# Patient Record
Sex: FEMALE | URBAN - METROPOLITAN AREA
[De-identification: names, ages, dates, MRNs, and addresses within clinical notes are randomized per-mention and may not be internally consistent; named-entity substitution may affect disease eponyms.]

---

## 2020-07-13 ENCOUNTER — APPOINTMENT (RX ONLY)
Dept: URBAN - METROPOLITAN AREA CLINIC 304 | Facility: CLINIC | Age: 20
Setting detail: DERMATOLOGY
End: 2020-07-13

## 2020-07-13 DIAGNOSIS — L91.0 HYPERTROPHIC SCAR: ICD-10-CM

## 2020-07-13 PROCEDURE — 99202 OFFICE O/P NEW SF 15 MIN: CPT

## 2020-07-13 PROCEDURE — ? COUNSELING

## 2020-07-13 ASSESSMENT — LOCATION ZONE DERM
LOCATION ZONE: EAR
LOCATION ZONE: NECK
LOCATION ZONE: ARM

## 2020-07-13 ASSESSMENT — LOCATION DETAILED DESCRIPTION DERM
LOCATION DETAILED: RIGHT ANTERIOR MEDIAL PROXIMAL UPPER ARM
LOCATION DETAILED: RIGHT INFERIOR ANTERIOR NECK
LOCATION DETAILED: RIGHT POSTERIOR EAR

## 2020-07-13 ASSESSMENT — LOCATION SIMPLE DESCRIPTION DERM
LOCATION SIMPLE: RIGHT ANTERIOR NECK
LOCATION SIMPLE: RIGHT UPPER ARM
LOCATION SIMPLE: RIGHT EAR

## 2020-07-13 NOTE — HPI: SCAR (KELOIDS)
How Severe Are They?: moderate
Is This A New Presentation, Or A Follow-Up?: Scars
Additional History: Hx otoplasty 2016

## 2021-04-07 ENCOUNTER — APPOINTMENT (RX ONLY)
Dept: URBAN - METROPOLITAN AREA CLINIC 304 | Facility: CLINIC | Age: 21
Setting detail: DERMATOLOGY
End: 2021-04-07

## 2021-04-07 DIAGNOSIS — L91.0 HYPERTROPHIC SCAR: ICD-10-CM

## 2021-04-07 DIAGNOSIS — L90.5 SCAR CONDITIONS AND FIBROSIS OF SKIN: ICD-10-CM

## 2021-04-07 PROCEDURE — ? TREATMENT REGIMEN

## 2021-04-07 PROCEDURE — ? COUNSELING

## 2021-04-07 PROCEDURE — 99213 OFFICE O/P EST LOW 20 MIN: CPT

## 2021-04-07 ASSESSMENT — LOCATION SIMPLE DESCRIPTION DERM: LOCATION SIMPLE: RIGHT EAR

## 2021-04-07 ASSESSMENT — LOCATION ZONE DERM: LOCATION ZONE: EAR

## 2021-04-07 ASSESSMENT — LOCATION DETAILED DESCRIPTION DERM: LOCATION DETAILED: RIGHT POSTERIOR EAR

## 2021-05-24 ENCOUNTER — APPOINTMENT (RX ONLY)
Dept: URBAN - METROPOLITAN AREA CLINIC 304 | Facility: CLINIC | Age: 21
Setting detail: DERMATOLOGY
End: 2021-05-24

## 2021-05-24 DIAGNOSIS — L91.0 HYPERTROPHIC SCAR: ICD-10-CM

## 2021-05-24 DIAGNOSIS — L55.9 SUNBURN, UNSPECIFIED: ICD-10-CM

## 2021-05-24 DIAGNOSIS — L20.89 OTHER ATOPIC DERMATITIS: ICD-10-CM

## 2021-05-24 PROBLEM — L20.84 INTRINSIC (ALLERGIC) ECZEMA: Status: ACTIVE | Noted: 2021-05-24

## 2021-05-24 PROCEDURE — ? TREATMENT REGIMEN

## 2021-05-24 PROCEDURE — 99213 OFFICE O/P EST LOW 20 MIN: CPT

## 2021-05-24 PROCEDURE — ? COUNSELING

## 2021-05-24 ASSESSMENT — LOCATION DETAILED DESCRIPTION DERM
LOCATION DETAILED: RIGHT POSTERIOR EAR
LOCATION DETAILED: LEFT PROXIMAL PRETIBIAL REGION

## 2021-05-24 ASSESSMENT — LOCATION ZONE DERM
LOCATION ZONE: EAR
LOCATION ZONE: LEG

## 2021-05-24 ASSESSMENT — LOCATION SIMPLE DESCRIPTION DERM
LOCATION SIMPLE: LEFT PRETIBIAL REGION
LOCATION SIMPLE: RIGHT EAR

## 2021-06-23 ENCOUNTER — APPOINTMENT (RX ONLY)
Dept: URBAN - METROPOLITAN AREA CLINIC 304 | Facility: CLINIC | Age: 21
Setting detail: DERMATOLOGY
End: 2021-06-23

## 2021-06-23 DIAGNOSIS — L90.5 SCAR CONDITIONS AND FIBROSIS OF SKIN: ICD-10-CM

## 2021-06-23 PROCEDURE — 11900 INJECT SKIN LESIONS </W 7: CPT

## 2021-06-23 PROCEDURE — ? SCAR INJECTION

## 2021-06-23 ASSESSMENT — LOCATION DETAILED DESCRIPTION DERM: LOCATION DETAILED: RIGHT POSTERIOR EAR

## 2021-06-23 ASSESSMENT — LOCATION SIMPLE DESCRIPTION DERM: LOCATION SIMPLE: RIGHT EAR

## 2021-06-23 ASSESSMENT — LOCATION ZONE DERM: LOCATION ZONE: EAR

## 2021-06-23 NOTE — PROCEDURE: SCAR INJECTION
X Size Of Lesion In Cm (Optional): 0
Include Z78.9 (Other Specified Conditions Influencing Health Status) As An Associated Diagnosis?: No
Total Volume (Ccs- Only Use Numbers And Decimals): 0.2
Concentration (Mg/Ml): 2.5
Consent: The risks of atrophy were reviewed with the patient.
Kenalog Preparation: Kenalog
Detail Level: Detailed
Medical Necessity Clause: This procedure was medically necessary because the lesions that were treated were: